# Patient Record
Sex: MALE
[De-identification: names, ages, dates, MRNs, and addresses within clinical notes are randomized per-mention and may not be internally consistent; named-entity substitution may affect disease eponyms.]

---

## 2019-04-01 ENCOUNTER — HOSPITAL ENCOUNTER (EMERGENCY)
Dept: HOSPITAL 42 - ED | Age: 49
Discharge: HOME | End: 2019-04-01
Payer: COMMERCIAL

## 2019-04-01 VITALS
RESPIRATION RATE: 16 BRPM | SYSTOLIC BLOOD PRESSURE: 122 MMHG | OXYGEN SATURATION: 99 % | HEART RATE: 88 BPM | DIASTOLIC BLOOD PRESSURE: 74 MMHG

## 2019-04-01 VITALS — BODY MASS INDEX: 31 KG/M2

## 2019-04-01 VITALS — TEMPERATURE: 98.1 F
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
## 2019-04-01 DIAGNOSIS — M54.5: Primary | ICD-10-CM
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
## 2019-04-01 PROCEDURE — 96372 THER/PROPH/DIAG INJ SC/IM: CPT

## 2019-04-01 PROCEDURE — 71101 X-RAY EXAM UNILAT RIBS/CHEST: CPT

## 2019-04-01 PROCEDURE — 72110 X-RAY EXAM L-2 SPINE 4/>VWS: CPT

## 2019-04-01 PROCEDURE — 99285 EMERGENCY DEPT VISIT HI MDM: CPT
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
## 2019-04-02 NOTE — RAD
Date of service: 



04/01/2019



PROCEDURE:  Radiographs of the Chest and Left Ribs.



HISTORY:

pain



COMPARISON:

None available. 



TECHNIQUE:

Frontal radiograph of the chest and multiple oblique radiographs of 

the left ribs were obtained. 4 views obtained.



FINDINGS:



LEFT RIBS:

No fracture or focal lesion visualized.



LUNGS:

Clear.



PLEURA:

No pneumothorax or pleural fluid.



CARDIOVASCULAR:

Normal cardiac size. No pulmonary vascular congestion. 



No aortic atherosclerotic calcification present



OTHER FINDINGS:

None.



IMPRESSION:

Unremarkable radiographs of the chest and left ribs. No left rib 

fracture.

## 2019-04-02 NOTE — RAD
Date of service: 



04/01/2019



PROCEDURE:  Radiographs of the Lumbar Spine.



HISTORY:

pain







COMPARISON:

No prior.



TECHNIQUE:

 5 views obtained.



FINDINGS:



BONES:

Posterior fixation S1-2.  Laminectomy at S1.  Pedicle screws 

bilaterally at S1 and S2.  No acute fracture.  Vertebral bodies 

maintained in height.  Normal alignment maintained.



DISC SPACES:

There is some narrowing of the L4-5 intervertebral disc space 

consistent with degenerative disc disease.



OTHER FINDINGS:

None.



IMPRESSION:

Status post laminectomy and posterior fusion at S1-S2.  No acute 

fracture.  Degenerative disc disease at L4-5.